# Patient Record
Sex: FEMALE | Race: OTHER | Employment: FULL TIME | URBAN - NONMETROPOLITAN AREA
[De-identification: names, ages, dates, MRNs, and addresses within clinical notes are randomized per-mention and may not be internally consistent; named-entity substitution may affect disease eponyms.]

---

## 2024-09-08 ENCOUNTER — HOSPITAL ENCOUNTER (EMERGENCY)
Age: 49
Discharge: HOME | End: 2024-09-08
Attending: FAMILY MEDICINE
Payer: COMMERCIAL

## 2024-09-08 VITALS
RESPIRATION RATE: 18 BRPM | OXYGEN SATURATION: 96 % | SYSTOLIC BLOOD PRESSURE: 136 MMHG | WEIGHT: 270 LBS | BODY MASS INDEX: 46.1 KG/M2 | TEMPERATURE: 98.1 F | HEIGHT: 64 IN | DIASTOLIC BLOOD PRESSURE: 81 MMHG | HEART RATE: 90 BPM

## 2024-09-08 DIAGNOSIS — H11.31 NON-TRAUMATIC SUBCONJUNCTIVAL HEMORRHAGE OF RIGHT EYE: Primary | ICD-10-CM

## 2024-09-08 PROCEDURE — 99282 EMERGENCY DEPT VISIT SF MDM: CPT

## 2024-09-08 ASSESSMENT — VISUAL ACUITY
OD: 20/30
OS: 20/50
OU: 20/25

## 2025-04-28 ENCOUNTER — HOSPITAL ENCOUNTER (EMERGENCY)
Dept: INTERVENTIONAL RADIOLOGY/VASCULAR | Age: 50
Discharge: HOME | End: 2025-04-30
Attending: FAMILY MEDICINE

## 2025-04-28 ENCOUNTER — APPOINTMENT (OUTPATIENT)
Dept: GENERAL RADIOLOGY | Age: 50
End: 2025-04-28
Attending: FAMILY MEDICINE

## 2025-04-28 ENCOUNTER — HOSPITAL ENCOUNTER (EMERGENCY)
Age: 50
Discharge: HOME | End: 2025-04-28
Attending: FAMILY MEDICINE

## 2025-04-28 VITALS
RESPIRATION RATE: 18 BRPM | TEMPERATURE: 98.3 F | HEART RATE: 98 BPM | SYSTOLIC BLOOD PRESSURE: 145 MMHG | OXYGEN SATURATION: 98 % | DIASTOLIC BLOOD PRESSURE: 84 MMHG

## 2025-04-28 DIAGNOSIS — M79.604 RIGHT LEG PAIN: ICD-10-CM

## 2025-04-28 DIAGNOSIS — M79.604 RIGHT LEG PAIN: Primary | ICD-10-CM

## 2025-04-28 LAB
ALBUMIN SERPL BCP-MCNC: 3.5 GM/DL (ref 3.4–5)
ALP SERPL-CCNC: 135 U/L (ref 46–116)
ALT SERPL W P-5'-P-CCNC: 64 U/L (ref 14–63)
ANION GAP SERPL CALC-SCNC: 6 MEQ/L (ref 8–16)
AST SERPL W P-5'-P-CCNC: 38 U/L (ref 15–37)
BASOPHILS # BLD: 0.5 % (ref 0–3)
BASOPHILS ABSOLUTE: 0 THOU/MM3 (ref 0–0.1)
BILIRUB SERPL-MCNC: 0.2 MG/DL (ref 0.2–1)
BUN SERPL-MCNC: 21 MG/DL (ref 7–18)
CALCIUM SERPL-MCNC: 9.5 MG/DL (ref 8.5–10.1)
CHLORIDE SERPL-SCNC: 106 MEQ/L (ref 98–107)
CO2 SERPL-SCNC: 30 MEQ/L (ref 21–32)
CREAT SERPL-MCNC: 0.9 MG/DL (ref 0.6–1.3)
EOSINOPHILS ABSOLUTE: 0.1 THOU/MM3 (ref 0–0.5)
EOSINOPHILS RELATIVE PERCENT: 1.5 % (ref 0–4)
GFR SERPL CREATININE-BSD FRML MDRD: 77 ML/MIN/1.73M2
GLUCOSE SERPL-MCNC: 136 MG/DL (ref 74–106)
HCT VFR BLD CALC: 38.4 % (ref 37–47)
HEMOGLOBIN: 12.5 GM/DL (ref 12–16)
IMMATURE GRANS (ABS): 0 THOU/MM3 (ref 0–0.07)
IMMATURE GRANULOCYTES %: 0 %
INR BLD: 0.96 (ref 0.85–1.13)
LYMPHOCYTES # BLD AUTO: 18.1 % (ref 15–47)
LYMPHOCYTES ABSOLUTE: 1.7 THOU/MM3 (ref 1–4.8)
MCH RBC QN AUTO: 29.1 PG (ref 26–32)
MCHC RBC AUTO-ENTMCNC: 32.6 GM/DL (ref 31–35)
MCV RBC AUTO: 89.3 FL (ref 81–99)
MONOCYTES: 0.6 THOU/MM3 (ref 0.3–1.3)
MONOCYTES: 5.8 % (ref 0–12)
NEUTROPHILS ABSOLUTE: 7 THOU/MM3 (ref 1.8–7.7)
PDW BLD-RTO: 14.1 % (ref 11.5–14.9)
PLATELET # BLD AUTO: 219 THOU/MM3 (ref 130–400)
PMV BLD AUTO: 11.2 FL (ref 9.4–12.4)
POTASSIUM SERPL-SCNC: 4.1 MEQ/L (ref 3.5–5.1)
PROT SERPL-MCNC: 7.2 GM/DL (ref 6.4–8.2)
RBC # BLD: 4.3 MILL/MM3 (ref 4.1–5.3)
SEG NEUTROPHILS: 73.8 % (ref 43–75)
SODIUM SERPL-SCNC: 142 MEQ/L (ref 136–145)
WBC # BLD: 9.5 THOU/MM3 (ref 4.8–10.8)

## 2025-04-28 PROCEDURE — 99284 EMERGENCY DEPT VISIT MOD MDM: CPT

## 2025-04-28 PROCEDURE — 36415 COLL VENOUS BLD VENIPUNCTURE: CPT

## 2025-04-28 PROCEDURE — 85610 PROTHROMBIN TIME: CPT

## 2025-04-28 PROCEDURE — 80053 COMPREHEN METABOLIC PANEL: CPT

## 2025-04-28 PROCEDURE — 73590 X-RAY EXAM OF LOWER LEG: CPT

## 2025-04-28 PROCEDURE — 93971 EXTREMITY STUDY: CPT

## 2025-04-28 PROCEDURE — 85025 COMPLETE CBC W/AUTO DIFF WBC: CPT

## 2025-04-28 RX ORDER — LISINOPRIL AND HYDROCHLOROTHIAZIDE 10; 12.5 MG/1; MG/1
1 TABLET ORAL DAILY
COMMUNITY
Start: 2025-03-18

## 2025-04-28 RX ORDER — TIRZEPATIDE 5 MG/.5ML
INJECTION, SOLUTION SUBCUTANEOUS
COMMUNITY
Start: 2025-02-09

## 2025-04-28 ASSESSMENT — PAIN SCALES - GENERAL: PAINLEVEL_OUTOF10: 9

## 2025-04-28 ASSESSMENT — PAIN - FUNCTIONAL ASSESSMENT: PAIN_FUNCTIONAL_ASSESSMENT: 0-10

## 2025-04-28 NOTE — ED NOTES
Patient presents to the ED via private auto with complaints of right lower leg pain.  States that she feels like her leg is swollen over the bone in her lower leg and that she has had pain for about two weeks. Denies known injury.

## 2025-04-28 NOTE — ED NOTES
Discharge teaching and instructions for condition explained to patient.  AVS reviewed.  Patient voiced understanding regarding follow up appointments in [place] for outpatient doppler study and care of self at home.  Pt discharged to in stable condition per self.

## 2025-04-28 NOTE — DISCHARGE INSTRUCTIONS
Dora N Reyes,    It has been my absolute pleasure to serve you while in the emergency department at [facility] today.  Please proceed to [facility] in [place] for ultrasound.   Please do remember to take all medications as prescribed.  Please make sure you follow-up with your PCP within 7 days.  Please follow-up with any and all specialists as we discussed.  Please return to the ER in case of any worsening of symptoms.  I wish you a speedy recovery!    Sincerely,    Dr. Sandeep Velez MD.

## 2025-04-28 NOTE — ED PROVIDER NOTES
ANION GAP - Abnormal; Notable for the following components:    Anion Gap 6.0 (*)     All other components within normal limits   CBC WITH AUTO DIFFERENTIAL   PROTIME-INR   GLOMERULAR FILTRATION RATE, ESTIMATED       EMERGENCY DEPARTMENT COURSE & MDM:   Vitals:    Vitals:    04/28/25 1317   BP: (!) 145/84   Pulse: 98   Resp: 18   Temp: 98.3 °F (36.8 °C)   SpO2: 98%       [facility]    1:18 PM EDT: The patient was seen and evaluated.  ED Course as of 04/28/25 1834 Mon Apr 28, 2025   1328 We will obtain labs including CBC CMP PT/INR XR of right tibia/fibula and right lower extremity venous duplex [SM]   1328 Pulses are palpable in the lower extremity [SM]   1330 Motor function and sensation are intact [SM]   1330 Unfortunately we do not have anybody here to do the ultrasound today so we will send the patient down to [place] to get it done. [SM]   1347 XR of right tibia/fibula does not reveal any acute osseous abnormality. CBC is within normal limits. Platelet count is normal. H/H is stable [SM]   1351 We will send the patient to [facility] in [place] for doppler [SM]   1359 INR is within normal limits [SM]   1403 CMP shows slightly elevated AST/ALT/ALK but no other acute abnormality [SM]   1833 Doppler is negative for DVT. Patient is advised to follow up with PCP regarding leg pain []      ED Course User Index  [SM] Sandeep Velez MD         1)  Number and Complexity of Problems  Problem List This Visit:   Chief Complaint   Patient presents with    Leg Pain     Diagnoses Considered but Do Not Suspect:  Please see Differential Diagnosis  Pertinent Comorbid Conditions:   Please see PMH and ED course     2)  Data Reviewed  EKG was reviewed in detail. See EKG section above.  Decision Rules/Scores utilized:  See ED course.  Tests considered but not ordered and why:  See ED course.   External Documents Reviewed:  Prior medical records  Imaging that is independently reviewed with the radiologist report, not interpreted by me are